# Patient Record
Sex: MALE | Race: WHITE | NOT HISPANIC OR LATINO | URBAN - METROPOLITAN AREA
[De-identification: names, ages, dates, MRNs, and addresses within clinical notes are randomized per-mention and may not be internally consistent; named-entity substitution may affect disease eponyms.]

---

## 2019-03-25 ENCOUNTER — OFFICE (OUTPATIENT)
Dept: URBAN - METROPOLITAN AREA CLINIC 75 | Facility: CLINIC | Age: 49
End: 2019-03-25
Payer: COMMERCIAL

## 2019-03-25 VITALS
WEIGHT: 246 LBS | HEART RATE: 60 BPM | HEIGHT: 74 IN | SYSTOLIC BLOOD PRESSURE: 118 MMHG | DIASTOLIC BLOOD PRESSURE: 82 MMHG

## 2019-03-25 DIAGNOSIS — Z12.11 ENCOUNTER FOR SCREENING FOR MALIGNANT NEOPLASM OF COLON: ICD-10-CM

## 2019-03-25 PROCEDURE — S0285 CNSLT BEFORE SCREEN COLONOSC: HCPCS | Performed by: INTERNAL MEDICINE

## 2019-04-22 VITALS
RESPIRATION RATE: 20 BRPM | OXYGEN SATURATION: 92 % | SYSTOLIC BLOOD PRESSURE: 115 MMHG | HEART RATE: 75 BPM | OXYGEN SATURATION: 94 % | SYSTOLIC BLOOD PRESSURE: 117 MMHG | DIASTOLIC BLOOD PRESSURE: 54 MMHG | HEIGHT: 75 IN | SYSTOLIC BLOOD PRESSURE: 124 MMHG | HEART RATE: 85 BPM | RESPIRATION RATE: 12 BRPM | DIASTOLIC BLOOD PRESSURE: 62 MMHG | DIASTOLIC BLOOD PRESSURE: 80 MMHG | OXYGEN SATURATION: 93 % | DIASTOLIC BLOOD PRESSURE: 86 MMHG | HEART RATE: 81 BPM | TEMPERATURE: 97.8 F | HEART RATE: 87 BPM | OXYGEN SATURATION: 95 % | DIASTOLIC BLOOD PRESSURE: 55 MMHG | OXYGEN SATURATION: 98 % | WEIGHT: 230 LBS | DIASTOLIC BLOOD PRESSURE: 105 MMHG | SYSTOLIC BLOOD PRESSURE: 93 MMHG | SYSTOLIC BLOOD PRESSURE: 173 MMHG | HEART RATE: 74 BPM | DIASTOLIC BLOOD PRESSURE: 110 MMHG | OXYGEN SATURATION: 97 % | SYSTOLIC BLOOD PRESSURE: 106 MMHG | DIASTOLIC BLOOD PRESSURE: 70 MMHG | HEART RATE: 76 BPM | RESPIRATION RATE: 11 BRPM | RESPIRATION RATE: 10 BRPM | SYSTOLIC BLOOD PRESSURE: 120 MMHG | RESPIRATION RATE: 16 BRPM | DIASTOLIC BLOOD PRESSURE: 74 MMHG | DIASTOLIC BLOOD PRESSURE: 75 MMHG | SYSTOLIC BLOOD PRESSURE: 144 MMHG | HEART RATE: 83 BPM | TEMPERATURE: 98.1 F | SYSTOLIC BLOOD PRESSURE: 98 MMHG | HEART RATE: 79 BPM | SYSTOLIC BLOOD PRESSURE: 99 MMHG | HEART RATE: 82 BPM | SYSTOLIC BLOOD PRESSURE: 119 MMHG

## 2019-04-23 ENCOUNTER — OFFICE (OUTPATIENT)
Dept: URBAN - METROPOLITAN AREA PATHOLOGY 4 | Facility: PATHOLOGY | Age: 49
End: 2019-04-23
Payer: COMMERCIAL

## 2019-04-23 ENCOUNTER — AMBULATORY SURGICAL CENTER (OUTPATIENT)
Dept: URBAN - METROPOLITAN AREA SURGERY 17 | Facility: SURGERY | Age: 49
End: 2019-04-23
Payer: COMMERCIAL

## 2019-04-23 DIAGNOSIS — D12.0 BENIGN NEOPLASM OF CECUM: ICD-10-CM

## 2019-04-23 DIAGNOSIS — Z12.11 ENCOUNTER FOR SCREENING FOR MALIGNANT NEOPLASM OF COLON: ICD-10-CM

## 2019-04-23 DIAGNOSIS — K57.30 DIVERTICULOSIS OF LARGE INTESTINE WITHOUT PERFORATION OR ABS: ICD-10-CM

## 2019-04-23 DIAGNOSIS — K63.5 POLYP OF COLON: ICD-10-CM

## 2019-04-23 DIAGNOSIS — K62.1 RECTAL POLYP: ICD-10-CM

## 2019-04-23 DIAGNOSIS — D12.3 BENIGN NEOPLASM OF TRANSVERSE COLON: ICD-10-CM

## 2019-04-23 DIAGNOSIS — K64.8 OTHER HEMORRHOIDS: ICD-10-CM

## 2019-04-23 LAB
GI HISTOLOGY: A. UNSPECIFIED: (no result)
GI HISTOLOGY: B. UNSPECIFIED: (no result)
GI HISTOLOGY: C. UNSPECIFIED: (no result)
GI HISTOLOGY: D. UNSPECIFIED: (no result)
GI HISTOLOGY: PDF REPORT: (no result)

## 2019-04-23 PROCEDURE — 88305 TISSUE EXAM BY PATHOLOGIST: CPT | Mod: 33 | Performed by: INTERNAL MEDICINE

## 2019-04-23 PROCEDURE — 45380 COLONOSCOPY AND BIOPSY: CPT | Mod: 33 | Performed by: INTERNAL MEDICINE

## 2021-03-23 ENCOUNTER — OFFICE VISIT CONVERTED (OUTPATIENT)
Dept: CARDIOLOGY | Facility: CLINIC | Age: 51
End: 2021-03-23
Attending: INTERNAL MEDICINE

## 2021-05-10 NOTE — H&P
History and Physical      Patient Name: Latrell Marcelo   Patient ID: [id number]   Sex: Male   YOB: 1970    Primary Care Provider: Latrell Saucedo MD   Referring Provider: Latrell Saucedo MD    Visit Date: March 23, 2021    Provider: Bruce Read MD   Location: [facility]   Location Address: [address] [place] [address]  [id number]          History Of Present Illness  Consult requested by: Latrell Saucedo MD   Latrell Marcelo is a 50-year-old White male who has been referred due to palpitations and fatigue. He had a COVID vaccine recently, and then, after that, he started to have symptoms of fatigue and weakness and questionably contracted COVID around that time. He then had his second vaccine shot approximately 6 weeks ago, and since then, he has been having palpitations and subjective tachycardia with ongoing fatigue. Over this time, the symptoms have lessened in severity. He has occasional mild chest pressure with this. He recently had a Holter monitor, which showed somewhat frequent PVCs, but also had a treadmill stress test, which was very low-risk with excellent functional capacity and no stress-induced arrhythmias. His baseline EKG showed sinus rhythm with a right bundle branch block. His basic labs are normal.   PAST MEDICAL HISTORY: Hypertension; Overweight; Palpitations; Colonoscopy.   PSYCHOSOCIAL HISTORY: No tobacco. Drinks alcohol daily. Rare caffeine intake. He is .   FAMILY HISTORY: Negative.   CURRENT MEDICATIONS: Crestor 20 mg daily; lisinopril 20 mg daily; Norvasc 5 mg daily; Zyloprim 300 mg daily.   ALLERGIES: No known drug allergies.       Review of Systems  · Constitutional  o Admits  o : fatigue, good general health lately  o Denies  o : recent weight changes   · Eyes  o Denies  o : double vision  · HENT  o Denies  o : hearing loss or ringing, chronic sinus problem, swollen glands in neck  · Cardiovascular  o Admits  o : palpitations  "(fast, fluttering, or skipping beats)  o Denies  o : chest pain, swelling (feet, ankles, hands), shortness of breath while walking or lying flat  · Respiratory  o Denies  o : chronic or frequent cough, asthma or wheezing, COPD  · Gastrointestinal  o Denies  o : ulcers, nausea or vomiting  · Neurologic  o Denies  o : lightheaded or dizzy, stroke, headaches  · Musculoskeletal  o Denies  o : joint pain, back pain  · Endocrine  o Denies  o : thyroid disease, diabetes, heat or cold intolerance, excessive thirst or urination  · Heme-Lymph  o Denies  o : bleeding or bruising tendency, anemia      Vitals  Date Time BP Position Site L\R Cuff Size HR RR TEMP (F) WT  HT  BMI kg/m2 BSA m2 O2 Sat FR L/min FiO2 HC       03/23/2021 02:30 /96 Sitting    87 - R   249lbs 0oz 6'  2\" 31.97 2.43       03/23/2021 02:30 /99 Sitting       249lbs 0oz 6'  2\" 31.97 2.43             Physical Examination  · Constitutional  o Appearance  o : Overweight, White male, pleasant, in no acute distress.  · Head and Face  o HEENT  o : No pallor, anicteric. Eyes normal. Moist mucous membranes.  · Neck  o Inspection/Palpation  o : Supple.   o Jugular Veins  o : No JVD. No carotid bruits.  · Respiratory  o Auscultation of Lungs  o : Clear to auscultation bilaterally. No crackles or wheezing.  · Cardiovascular  o Heart  o : Slightly irregular rhythm.   · Gastrointestinal  o Abdominal Examination  o : Soft, non-distended. No palpable hepatosplenomegaly. Bowel sounds heard in all four quadrants.  · Musculoskeletal  o General  o : Normal muscle tone and strength.  · Skin and Subcutaneous Tissue  o General Inspection  o : No skin rashes.  · Extremities  o Extremities  o : Warm and well perfused. Distal pulses present. No pitting pedal edema.  · EKG  o EKG  o : His EKG showed sinus rhythm with a right bundle branch block.   · Labs  o Labs  o : His basic labs include controlled lipids, normal CBC, normal chemistry.  · Diagnostic Testing  o Diagnostic " Testing  o : Treadmill test and Holter monitor reviewed.  · Data  o Data  o : Primary Care records were reviewed.              Assessment     1.  Recent COVID in the setting of also getting the COVID vaccine.  This was followed by fatigue, heart        palpitations, and subjective tachycardia.  His recent treadmill test was low-risk.  His Holter monitor showed        somewhat frequent PVCs, but no sustained arrhythmias.    2.  Hypertension, mildly uncontrolled today.  3.  Dyslipidemia, stable.  4.  Overweight.  5.  Questionable sleep apnea, based on clinical history.       Plan     1.  I think his symptoms are likely related to inflammatory response after receiving the COVID vaccination        schedule.  He could have had minor myocarditis based on the ectopy and irregular rhythm he is having.  I        will obtain an echo to evaluate for any signs of this.  Otherwise, I agree with his current medical therapy.   2.  I am referring him to Dr. Canas for a sleep apnea evaluation.    3.  I will see him back in the office in a couple of months for followup.  I suspect his symptoms are going to        resolve with time.  He is agreeable with this plan.    It is a pleasure to assist in his care.  Please let me know if you have any questions regarding his case.    ARA Read MD  CBD:vm             Electronically Signed by: Sonia Meza-, Other -Author on April 3, 2021 03:40:59 PM  Electronically Co-signed by: Bruce Read MD -Reviewer on April 5, 2021 08:59:37 AM

## 2021-05-14 VITALS
WEIGHT: 249 LBS | HEIGHT: 74 IN | BODY MASS INDEX: 31.95 KG/M2 | SYSTOLIC BLOOD PRESSURE: 132 MMHG | HEART RATE: 87 BPM | DIASTOLIC BLOOD PRESSURE: 96 MMHG